# Patient Record
(demographics unavailable — no encounter records)

---

## 2025-04-07 NOTE — ASSESSMENT
[FreeTextEntry1] : Peanut allergy as noted from his positive allergy skin test. No evidence of shellfish allergy as noted from negative allergy skin test. ImmunoCAP to peanut to determine the degree of his allergenicity and decide whether he needs to have an EpiPen available.

## 2025-04-07 NOTE — HISTORY OF PRESENT ILLNESS
[de-identified] : Brandon Pierce is an [id number] male who is brought in by his mother who gives the following medical history.  In December 2020 for 3 hours after eating a shrimp and rice he developed a blotchy rash.  It occurred in various areas on his body.  He did not seem itchy and or uncomfortable.  Mother treated him with Benadryl.  The symptoms did not resolve with Benadryl.  Mother has had him avoid shrimp as well as other shellfish.  Approximately 2 weeks ago he had a he had peanut butter and he developed a blotchy rash approximately 1 hour after eating the peanut butter.  The rash resolved on its own within a short period of time.  He has had no problems with any other recurrent rash. When he was younger he did have a history of eczema that occurred on his face and neck.  He was treated with some cream ointment which did help resolve the issue.    [de-identified] : Shellfish, peanut

## 2025-04-07 NOTE — IMPRESSION
[_____] : peanut ([unfilled]) [] : shellfish [FreeTextEntry2] : Allergy skin test to foods are 2+ to peanuts, negative to crab, lobster and shrimp.